# Patient Record
Sex: FEMALE | Race: OTHER | HISPANIC OR LATINO
[De-identification: names, ages, dates, MRNs, and addresses within clinical notes are randomized per-mention and may not be internally consistent; named-entity substitution may affect disease eponyms.]

---

## 2020-08-26 PROBLEM — Z00.00 ENCOUNTER FOR PREVENTIVE HEALTH EXAMINATION: Status: ACTIVE | Noted: 2020-08-26

## 2020-08-27 ENCOUNTER — APPOINTMENT (OUTPATIENT)
Dept: ENDOCRINOLOGY | Facility: CLINIC | Age: 35
End: 2020-08-27
Payer: COMMERCIAL

## 2020-08-27 ENCOUNTER — LABORATORY RESULT (OUTPATIENT)
Age: 35
End: 2020-08-27

## 2020-08-27 VITALS
BODY MASS INDEX: 38.8 KG/M2 | OXYGEN SATURATION: 97 % | DIASTOLIC BLOOD PRESSURE: 84 MMHG | HEART RATE: 97 BPM | SYSTOLIC BLOOD PRESSURE: 134 MMHG | HEIGHT: 69.5 IN | WEIGHT: 268 LBS

## 2020-08-27 DIAGNOSIS — Z83.3 FAMILY HISTORY OF DIABETES MELLITUS: ICD-10-CM

## 2020-08-27 DIAGNOSIS — Z86.39 PERSONAL HISTORY OF OTHER ENDOCRINE, NUTRITIONAL AND METABOLIC DISEASE: ICD-10-CM

## 2020-08-27 LAB
GLUCOSE BLDC GLUCOMTR-MCNC: 196
HBA1C MFR BLD HPLC: 9.5

## 2020-08-27 PROCEDURE — 36415 COLL VENOUS BLD VENIPUNCTURE: CPT

## 2020-08-27 PROCEDURE — 82962 GLUCOSE BLOOD TEST: CPT

## 2020-08-27 PROCEDURE — 99203 OFFICE O/P NEW LOW 30 MIN: CPT | Mod: 25

## 2020-08-27 NOTE — PHYSICAL EXAM
[Alert] : alert [Well Nourished] : well nourished [No Acute Distress] : no acute distress [Well Developed] : well developed [Normal Sclera/Conjunctiva] : normal sclera/conjunctiva [EOMI] : extra ocular movement intact [No Proptosis] : no proptosis [Normal Oropharynx] : the oropharynx was normal [No Thyroid Nodules] : no palpable thyroid nodules [No Respiratory Distress] : no respiratory distress [No Accessory Muscle Use] : no accessory muscle use [Clear to Auscultation] : lungs were clear to auscultation bilaterally [Normal S1, S2] : normal S1 and S2 [Normal Rate] : heart rate was normal [Regular Rhythm] : with a regular rhythm [No Edema] : no peripheral edema [Pedal Pulses Normal] : the pedal pulses are present [Normal Bowel Sounds] : normal bowel sounds [Not Tender] : non-tender [Not Distended] : not distended [Soft] : abdomen soft [Normal Anterior Cervical Nodes] : no anterior cervical lymphadenopathy [Normal Posterior Cervical Nodes] : no posterior cervical lymphadenopathy [No Spinal Tenderness] : no spinal tenderness [Spine Straight] : spine straight [No Stigmata of Cushings Syndrome] : no stigmata of Cushings Syndrome [Normal Gait] : normal gait [Normal Strength/Tone] : muscle strength and tone were normal [No Rash] : no rash [Acne] : acne present [Hirsutism] : hirsutism present [Right Foot Was Examined] : right foot ~C was examined [Left Foot Was Examined] : left foot ~C was examined [Normal] : normal [Full ROM] : with full range of motion [Normal Reflexes] : deep tendon reflexes were 2+ and symmetric [No Tremors] : no tremors [Oriented x3] : oriented to person, place, and time [Acanthosis Nigricans] : no acanthosis nigricans [de-identified] : facial hair on chin and neck [de-identified] : thyroid full no nodule appreciated [Diminished Throughout Both Feet] : normal tactile sensation with monofilament testing throughout both feet

## 2020-08-27 NOTE — ASSESSMENT
[Carbohydrate Consistent Diet] : carbohydrate consistent diet [Diabetes Foot Care] : diabetes foot care [Retinopathy Screening] : Patient was referred to ophthalmology for retinopathy screening [Importance of Diet and Exercise] : importance of diet and exercise to improve glycemic control, achieve weight loss and improve cardiovascular health [Self Monitoring of Blood Glucose] : self monitoring of blood glucose [FreeTextEntry1] : Patient with poorly controlled type 2 diabetes since her A1c today is 9.0. She will gradually increase her metformin to 1500 and day. Will start a GLP 1 weekly. f/u CDE after labs in 2 weeks. Will also meet with dietician. She will need a new glucose meter. Encourage healthy diet and lifestyle. Will check TFTs today. To do baseline thyroid sonogram, Check androgens due to hirsutism.

## 2020-08-27 NOTE — CONSULT LETTER
[Dear  ___] : Dear  [unfilled], [( Thank you for referring [unfilled] for consultation for _____ )] : Thank you for referring [unfilled] for consultation for [unfilled] [Sincerely,] : Sincerely, [Please see my note below.] : Please see my note below. [Consult Closing:] : Thank you very much for allowing me to participate in the care of this patient.  If you have any questions, please do not hesitate to contact me. [FreeTextEntry3] : Toma Solomon MD\par

## 2020-08-27 NOTE — REVIEW OF SYSTEMS
[Fatigue] : fatigue [Recent Weight Loss (___ Lbs)] : recent weight loss: [unfilled] lbs [Nasal Congestion] : nasal congestion [Constipation] : constipation [Diarrhea] : diarrhea [Joint Pain] : joint pain [Nocturia] : nocturia [Joint Stiffness] : joint stiffness [Back Pain] : back pain [Hair Loss] : hair loss [Dizziness] : dizziness [As Noted in HPI] : as noted in HPI [Negative] : Heme/Lymph [All other systems negative] : All other systems negative [FreeTextEntry5] : h/o costochondritis [FreeTextEntry4] : sinus problems, enlarged tonsild [FreeTextEntry3] : glasses [FreeTextEntry9] : wrist and ankles [de-identified] : hypothyroid since 15

## 2020-08-27 NOTE — REASON FOR VISIT
[Consultation] : a consultation visit [DM Type 2] : DM Type 2 [Hypothyroidism] : hypothyroidism [Weight Management/Obesity] : weight management/obesity [FreeTextEntry2] : Dr. Kraft

## 2020-08-27 NOTE — HISTORY OF PRESENT ILLNESS
[FreeTextEntry1] : Patient has a long-term history of hypothyroidism since age 15. She is currently on Synthroid 175 mcg daily. She's not had his recent laboratory tests. Many years ago she had a thyroid ultrasound and was not told of any abnormalities. She was diagnosed with type 2 diabetes approximately 3 years ago and is currently on metformin 500 mg twice a day. Over the past 6 months she has managed to lose around 30 pounds. She has had problems with facial hair but has always had regular menses. She has never been pregnant..

## 2020-09-09 ENCOUNTER — APPOINTMENT (OUTPATIENT)
Dept: ENDOCRINOLOGY | Facility: CLINIC | Age: 35
End: 2020-09-09
Payer: COMMERCIAL

## 2020-09-09 VITALS
HEART RATE: 95 BPM | SYSTOLIC BLOOD PRESSURE: 129 MMHG | BODY MASS INDEX: 38.98 KG/M2 | OXYGEN SATURATION: 97 % | WEIGHT: 269.25 LBS | HEIGHT: 69.5 IN | DIASTOLIC BLOOD PRESSURE: 72 MMHG

## 2020-09-09 LAB
25(OH)D3 SERPL-MCNC: 17.8 NG/ML
ALBUMIN SERPL ELPH-MCNC: 4.9 G/DL
ALP BLD-CCNC: 68 U/L
ALT SERPL-CCNC: 58 U/L
ANION GAP SERPL CALC-SCNC: 13 MMOL/L
APPEARANCE: ABNORMAL
AST SERPL-CCNC: 48 U/L
BASOPHILS # BLD AUTO: 0.05 K/UL
BASOPHILS NFR BLD AUTO: 0.5 %
BILIRUB SERPL-MCNC: 0.4 MG/DL
BILIRUBIN URINE: NEGATIVE
BLOOD URINE: NEGATIVE
BUN SERPL-MCNC: 13 MG/DL
CALCIUM SERPL-MCNC: 9.7 MG/DL
CHLORIDE SERPL-SCNC: 100 MMOL/L
CHOLEST SERPL-MCNC: 231 MG/DL
CHOLEST/HDLC SERPL: 5 RATIO
CO2 SERPL-SCNC: 24 MMOL/L
COLOR: ABNORMAL
CORTIS SERPL-MCNC: 3.4 UG/DL
CREAT SERPL-MCNC: 0.65 MG/DL
CREAT SPEC-SCNC: 284 MG/DL
DHEA-S SERPL-MCNC: 40.8 UG/DL
EOSINOPHIL # BLD AUTO: 0.2 K/UL
EOSINOPHIL NFR BLD AUTO: 2.1 %
FERRITIN SERPL-MCNC: 124 NG/ML
FOLATE SERPL-MCNC: 13.6 NG/ML
GLUCOSE BLDC GLUCOMTR-MCNC: 257
GLUCOSE QUALITATIVE U: NORMAL
GLUCOSE SERPL-MCNC: 193 MG/DL
HCT VFR BLD CALC: 41.1 %
HDLC SERPL-MCNC: 46 MG/DL
HGB BLD-MCNC: 13.7 G/DL
IMM GRANULOCYTES NFR BLD AUTO: 0.2 %
IRON SATN MFR SERPL: 23 %
IRON SERPL-MCNC: 89 UG/DL
KETONES URINE: NORMAL
LDLC SERPL CALC-MCNC: 141 MG/DL
LEUKOCYTE ESTERASE URINE: NEGATIVE
LYMPHOCYTES # BLD AUTO: 4.17 K/UL
LYMPHOCYTES NFR BLD AUTO: 44.3 %
MAN DIFF?: NORMAL
MCHC RBC-ENTMCNC: 30.3 PG
MCHC RBC-ENTMCNC: 33.3 GM/DL
MCV RBC AUTO: 90.9 FL
MICROALBUMIN 24H UR DL<=1MG/L-MCNC: 4.2 MG/DL
MICROALBUMIN/CREAT 24H UR-RTO: 15 MG/G
MONOCYTES # BLD AUTO: 0.43 K/UL
MONOCYTES NFR BLD AUTO: 4.6 %
NEUTROPHILS # BLD AUTO: 4.55 K/UL
NEUTROPHILS NFR BLD AUTO: 48.3 %
NITRITE URINE: NEGATIVE
PH URINE: 5.5
PLATELET # BLD AUTO: 181 K/UL
POTASSIUM SERPL-SCNC: 3.8 MMOL/L
PROT SERPL-MCNC: 7.7 G/DL
PROTEIN URINE: ABNORMAL
RBC # BLD: 4.52 M/UL
RBC # FLD: 11.8 %
SAVE SPECIMEN: NORMAL
SHBG SERPL-SCNC: 37 NMOL/L
SODIUM SERPL-SCNC: 137 MMOL/L
SPECIFIC GRAVITY URINE: 1.03
T4 FREE SERPL-MCNC: 1.7 NG/DL
T4 SERPL-MCNC: 8.9 UG/DL
TESTOST BND SERPL-MCNC: 1.1 PG/ML
TESTOST SERPL-MCNC: 15.2 NG/DL
THYROGLOB AB SERPL-ACNC: <20 IU/ML
THYROPEROXIDASE AB SERPL IA-ACNC: 180 IU/ML
TIBC SERPL-MCNC: 389 UG/DL
TRIGL SERPL-MCNC: 222 MG/DL
TSH SERPL-ACNC: 3.53 UIU/ML
UIBC SERPL-MCNC: 300 UG/DL
UROBILINOGEN URINE: ABNORMAL
VIT B12 SERPL-MCNC: 826 PG/ML
WBC # FLD AUTO: 9.42 K/UL

## 2020-09-09 PROCEDURE — 82962 GLUCOSE BLOOD TEST: CPT

## 2020-09-09 PROCEDURE — G0108 DIAB MANAGE TRN  PER INDIV: CPT

## 2020-09-09 RX ORDER — DULAGLUTIDE 0.75 MG/.5ML
0.75 INJECTION, SOLUTION SUBCUTANEOUS
Qty: 1 | Refills: 2 | Status: DISCONTINUED | COMMUNITY
Start: 2020-09-09 | End: 2020-09-09

## 2020-09-09 RX ORDER — BLOOD-GLUCOSE METER
W/DEVICE KIT MISCELLANEOUS
Qty: 1 | Refills: 1 | Status: ACTIVE | COMMUNITY
Start: 2020-09-09

## 2020-10-07 ENCOUNTER — APPOINTMENT (OUTPATIENT)
Dept: ENDOCRINOLOGY | Facility: CLINIC | Age: 35
End: 2020-10-07
Payer: COMMERCIAL

## 2020-10-07 VITALS
BODY MASS INDEX: 39.25 KG/M2 | SYSTOLIC BLOOD PRESSURE: 133 MMHG | OXYGEN SATURATION: 95 % | HEART RATE: 90 BPM | WEIGHT: 271.13 LBS | DIASTOLIC BLOOD PRESSURE: 85 MMHG | HEIGHT: 69.5 IN

## 2020-10-07 LAB — GLUCOSE BLDC GLUCOMTR-MCNC: 228

## 2020-10-07 PROCEDURE — 99214 OFFICE O/P EST MOD 30 MIN: CPT | Mod: 25

## 2020-10-07 PROCEDURE — 95251 CONT GLUC MNTR ANALYSIS I&R: CPT

## 2020-10-07 PROCEDURE — 95249 CONT GLUC MNTR PT PROV EQP: CPT

## 2020-10-07 PROCEDURE — 82962 GLUCOSE BLOOD TEST: CPT

## 2020-10-07 NOTE — ASSESSMENT
[FreeTextEntry1] : Patient with poorly controlled type 2 diabetes since her A1c was 9.0. She did increase her metformin to 1500 and has some mild GI symptoms. She did start Ozempic and is on 0.25 mg weekly. Will increase to 0.5 when GI symptoms resolve. . The sensor readings are excellent but she needs to scan more often. \par Will also meet with dietician.  Encourage healthy diet and lifestyle. Still to do baseline thyroid sonogram,  We did Check androgens due to hirsutism but were normal. .TFTs were stable on Synthroid 175 ug.  [Diabetes Foot Care] : diabetes foot care [Long Term Vascular Complications] : long term vascular complications of diabetes [Carbohydrate Consistent Diet] : carbohydrate consistent diet [Importance of Diet and Exercise] : importance of diet and exercise to improve glycemic control, achieve weight loss and improve cardiovascular health [Exercise/Effect on Glucose] : exercise/effect on glucose [Self Monitoring of Blood Glucose] : self monitoring of blood glucose [Retinopathy Screening] : Patient was referred to ophthalmology for retinopathy screening [Levothyroxine] : The patient was instructed to take Levothyroxine on an empty stomach, separate from vitamins, and wait at least 30 minutes before eating

## 2020-10-07 NOTE — HISTORY OF PRESENT ILLNESS
[FreeTextEntry1] : Patient has a long-term history of hypothyroidism since age 15. She is currently on Synthroid 175 mcg daily.  Many years ago she had a thyroid ultrasound and was not told of any abnormalities. She was diagnosed with type 2 diabetes approximately 3 years ago and is currently on metformin 1500 mg a  day. Over the past 6 months she has managed to lose around 30 pounds. She has had problems with facial hair but has always had regular menses. She has never been pregnant.. She started Ozempic a few sweets ago and has experienced some intermittent GI symptoms and diarrhea. She is eating less but no weight loss. She is using the jenni sensor.  [Continuous Glucose Monitoring] : Continuous Glucose Monitoring: Yes [Lakia] : Lakia [FreeTextEntry2] : 90 [FreeTextEntry3] : 10 [FreeTextEntry4] : 0 [FreeTextEntry5] : 147 [FreeTextEntry6] : 15

## 2020-10-07 NOTE — PHYSICAL EXAM
[Alert] : alert [Well Nourished] : well nourished [No Acute Distress] : no acute distress [Well Developed] : well developed [Normal Sclera/Conjunctiva] : normal sclera/conjunctiva [EOMI] : extra ocular movement intact [No Proptosis] : no proptosis [Normal Oropharynx] : the oropharynx was normal [No Thyroid Nodules] : no palpable thyroid nodules [No Respiratory Distress] : no respiratory distress [No Accessory Muscle Use] : no accessory muscle use [Clear to Auscultation] : lungs were clear to auscultation bilaterally [Normal S1, S2] : normal S1 and S2 [Normal Rate] : heart rate was normal [Regular Rhythm] : with a regular rhythm [No Edema] : no peripheral edema [Pedal Pulses Normal] : the pedal pulses are present [Normal Bowel Sounds] : normal bowel sounds [Not Tender] : non-tender [Not Distended] : not distended [Soft] : abdomen soft [Normal Anterior Cervical Nodes] : no anterior cervical lymphadenopathy [No Spinal Tenderness] : no spinal tenderness [Spine Straight] : spine straight [No Stigmata of Cushings Syndrome] : no stigmata of Cushings Syndrome [Normal Gait] : normal gait [Normal Strength/Tone] : muscle strength and tone were normal [No Rash] : no rash [Acanthosis Nigricans] : no acanthosis nigricans [Acne] : acne present [Hirsutism] : hirsutism present [Right Foot Was Examined] : right foot ~C was examined [Left Foot Was Examined] : left foot ~C was examined [Normal] : normal [Full ROM] : with full range of motion [Diminished Throughout Both Feet] : normal tactile sensation with monofilament testing throughout both feet [Normal Reflexes] : deep tendon reflexes were 2+ and symmetric [No Tremors] : no tremors [Oriented x3] : oriented to person, place, and time [de-identified] : thyroid full no nodule appreciated [de-identified] : facial hair on chin and neck

## 2020-12-03 ENCOUNTER — APPOINTMENT (OUTPATIENT)
Dept: ENDOCRINOLOGY | Facility: CLINIC | Age: 35
End: 2020-12-03
Payer: COMMERCIAL

## 2020-12-03 VITALS
TEMPERATURE: 97.5 F | DIASTOLIC BLOOD PRESSURE: 80 MMHG | HEART RATE: 86 BPM | SYSTOLIC BLOOD PRESSURE: 127 MMHG | HEIGHT: 69.5 IN | WEIGHT: 263 LBS | BODY MASS INDEX: 38.08 KG/M2 | OXYGEN SATURATION: 96 %

## 2020-12-03 LAB
GLUCOSE BLDC GLUCOMTR-MCNC: 154
HBA1C MFR BLD HPLC: 6.9

## 2020-12-03 PROCEDURE — 99213 OFFICE O/P EST LOW 20 MIN: CPT | Mod: 25

## 2020-12-03 PROCEDURE — 82962 GLUCOSE BLOOD TEST: CPT

## 2020-12-03 PROCEDURE — 99072 ADDL SUPL MATRL&STAF TM PHE: CPT

## 2020-12-03 PROCEDURE — 83036 HEMOGLOBIN GLYCOSYLATED A1C: CPT | Mod: QW

## 2020-12-03 RX ORDER — METFORMIN HYDROCHLORIDE 500 MG/1
500 TABLET, COATED ORAL
Qty: 90 | Refills: 0 | Status: ACTIVE | COMMUNITY
Start: 1900-01-01 | End: 1900-01-01

## 2020-12-03 RX ORDER — FLASH GLUCOSE SCANNING READER
EACH MISCELLANEOUS
Qty: 1 | Refills: 0 | Status: ACTIVE | COMMUNITY
Start: 2020-12-03 | End: 1900-01-01

## 2020-12-03 NOTE — PHYSICAL EXAM
[Alert] : alert [Well Nourished] : well nourished [No Acute Distress] : no acute distress [Well Developed] : well developed [Normal Sclera/Conjunctiva] : normal sclera/conjunctiva [EOMI] : extra ocular movement intact [No Proptosis] : no proptosis [Normal Oropharynx] : the oropharynx was normal [No Thyroid Nodules] : no palpable thyroid nodules [No Respiratory Distress] : no respiratory distress [No Accessory Muscle Use] : no accessory muscle use [Clear to Auscultation] : lungs were clear to auscultation bilaterally [Normal S1, S2] : normal S1 and S2 [Normal Rate] : heart rate was normal [Regular Rhythm] : with a regular rhythm [No Edema] : no peripheral edema [Pedal Pulses Normal] : the pedal pulses are present [Normal Bowel Sounds] : normal bowel sounds [Not Tender] : non-tender [Not Distended] : not distended [Soft] : abdomen soft [Normal Anterior Cervical Nodes] : no anterior cervical lymphadenopathy [No Spinal Tenderness] : no spinal tenderness [Spine Straight] : spine straight [No Stigmata of Cushings Syndrome] : no stigmata of Cushings Syndrome [Normal Gait] : normal gait [Normal Strength/Tone] : muscle strength and tone were normal [No Rash] : no rash [Acanthosis Nigricans] : no acanthosis nigricans [Acne] : acne present [Hirsutism] : hirsutism present [Right Foot Was Examined] : right foot ~C was examined [Left Foot Was Examined] : left foot ~C was examined [Normal] : normal [Full ROM] : with full range of motion [Diminished Throughout Both Feet] : normal tactile sensation with monofilament testing throughout both feet [Normal Reflexes] : deep tendon reflexes were 2+ and symmetric [No Tremors] : no tremors [Oriented x3] : oriented to person, place, and time [de-identified] : thyroid full no nodule appreciated [de-identified] : facial hair on chin and neck

## 2020-12-03 NOTE — ASSESSMENT
[FreeTextEntry1] : Patient with poorly controlled type 2 diabetes  prior  A1c was 9.6 and is now 6.9.. She did increase her metformin to 1500 and has some mild GI symptoms. She did start Ozempic and is on 0.5 mg weekly.\par Will also meet with dietician.  Encourage healthy diet and lifestyle. Still to do baseline thyroid sonogram,  We did Check androgens due to hirsutism but were normal. .TFTs were stable on Synthroid 175 ug. To upgrade to jenni 2. [Carbohydrate Consistent Diet] : carbohydrate consistent diet [Importance of Diet and Exercise] : importance of diet and exercise to improve glycemic control, achieve weight loss and improve cardiovascular health [Diabetic Medications] : Risks and benefits of diabetic medications were discussed [Levothyroxine] : The patient was instructed to take Levothyroxine on an empty stomach, separate from vitamins, and wait at least 30 minutes before eating

## 2020-12-03 NOTE — HISTORY OF PRESENT ILLNESS
[FreeTextEntry1] : Patient has a long-term history of hypothyroidism since age 15. She is currently on Synthroid 175 mcg daily.  Many years ago she had a thyroid ultrasound and was not told of any abnormalities. She was diagnosed with type 2 diabetes approximately 3 years ago and is currently on metformin 1500 mg a  day. Over the past 6 months she has managed to lose around 30 pounds. She has had problems with facial hair but has always had regular menses. She has never been pregnant.. Has been on  Ozempic a few weeks ago went up to 0.5 mg. has experienced some intermittent GI symptoms and diarrhea. She is eating less. She is using the jenni sensor. Ran out of sensors.

## 2020-12-28 ENCOUNTER — APPOINTMENT (OUTPATIENT)
Dept: ENDOCRINOLOGY | Facility: CLINIC | Age: 35
End: 2020-12-28

## 2021-03-04 ENCOUNTER — APPOINTMENT (OUTPATIENT)
Dept: ENDOCRINOLOGY | Facility: CLINIC | Age: 36
End: 2021-03-04
Payer: COMMERCIAL

## 2021-03-04 ENCOUNTER — LABORATORY RESULT (OUTPATIENT)
Age: 36
End: 2021-03-04

## 2021-03-04 VITALS
TEMPERATURE: 98.6 F | BODY MASS INDEX: 38.66 KG/M2 | SYSTOLIC BLOOD PRESSURE: 137 MMHG | DIASTOLIC BLOOD PRESSURE: 86 MMHG | WEIGHT: 267 LBS | HEART RATE: 92 BPM | OXYGEN SATURATION: 97 % | HEIGHT: 69.5 IN

## 2021-03-04 DIAGNOSIS — M25.579 PAIN IN UNSPECIFIED ANKLE AND JOINTS OF UNSPECIFIED FOOT: ICD-10-CM

## 2021-03-04 DIAGNOSIS — M79.676 PAIN IN UNSPECIFIED TOE(S): ICD-10-CM

## 2021-03-04 LAB
GLUCOSE BLDC GLUCOMTR-MCNC: 189
HBA1C MFR BLD HPLC: 7.2

## 2021-03-04 PROCEDURE — 83036 HEMOGLOBIN GLYCOSYLATED A1C: CPT | Mod: QW

## 2021-03-04 PROCEDURE — 99214 OFFICE O/P EST MOD 30 MIN: CPT | Mod: 25

## 2021-03-04 PROCEDURE — 82962 GLUCOSE BLOOD TEST: CPT

## 2021-03-04 PROCEDURE — 99072 ADDL SUPL MATRL&STAF TM PHE: CPT

## 2021-03-04 NOTE — ASSESSMENT
[FreeTextEntry1] : Patient with moderately controlled type 2 diabetes  prior  A1c was 9.6  now 7.1.. She did increase her metformin to 1500 and has some mild GI symptoms. Will try 2000 again. Try to renew Ozempic or change to different GLP1. To resume the sensor, \par  Encourage healthy diet and lifestyle. Still to do baseline thyroid sonogram,  We did Check androgens due to hirsutism but were normal. .TFTs were stable on Synthroid 175 ug. Labs and thyroid sonogram to be done.  [Diabetes Foot Care] : diabetes foot care [Long Term Vascular Complications] : long term vascular complications of diabetes [Carbohydrate Consistent Diet] : carbohydrate consistent diet [Importance of Diet and Exercise] : importance of diet and exercise to improve glycemic control, achieve weight loss and improve cardiovascular health [Self Monitoring of Blood Glucose] : self monitoring of blood glucose [Retinopathy Screening] : Patient was referred to ophthalmology for retinopathy screening [Diabetic Medications] : Risks and benefits of diabetic medications were discussed [Levothyroxine] : The patient was instructed to take Levothyroxine on an empty stomach, separate from vitamins, and wait at least 30 minutes before eating

## 2021-03-04 NOTE — HISTORY OF PRESENT ILLNESS
[FreeTextEntry1] : Patient has a long-term history of hypothyroidism since age 15. She is currently on Synthroid 175 mcg daily.  Many years ago she had a thyroid ultrasound and was not told of any abnormalities. She was diagnosed with type 2 diabetes approximately 3 years ago and is currently on metformin 1500 mg a  day. Over the past 6 months she has managed to lose around 30 pounds. She has had problems with facial hair but has always had regular menses. She has never been pregnant.. Had been on  Ozempic went up to 0.5 mg. and experienced some intermittent GI symptoms and diarrhea. She is eating less however was too expensive to renew Ozempic. . She is using the jenni sensor. Ran out of sensors. Has not done labs or thyroid sonogram.

## 2021-03-04 NOTE — PHYSICAL EXAM
[Alert] : alert [Well Nourished] : well nourished [No Acute Distress] : no acute distress [Well Developed] : well developed [Normal Sclera/Conjunctiva] : normal sclera/conjunctiva [EOMI] : extra ocular movement intact [No Proptosis] : no proptosis [Normal Oropharynx] : the oropharynx was normal [No Thyroid Nodules] : no palpable thyroid nodules [No Respiratory Distress] : no respiratory distress [No Accessory Muscle Use] : no accessory muscle use [Clear to Auscultation] : lungs were clear to auscultation bilaterally [Normal S1, S2] : normal S1 and S2 [Normal Rate] : heart rate was normal [Regular Rhythm] : with a regular rhythm [No Edema] : no peripheral edema [Pedal Pulses Normal] : the pedal pulses are present [Normal Bowel Sounds] : normal bowel sounds [Not Tender] : non-tender [Not Distended] : not distended [Soft] : abdomen soft [Normal Anterior Cervical Nodes] : no anterior cervical lymphadenopathy [No Spinal Tenderness] : no spinal tenderness [Spine Straight] : spine straight [No Stigmata of Cushings Syndrome] : no stigmata of Cushings Syndrome [Normal Gait] : normal gait [Normal Strength/Tone] : muscle strength and tone were normal [No Rash] : no rash [Acanthosis Nigricans] : no acanthosis nigricans [Acne] : acne present [Hirsutism] : hirsutism present [Right Foot Was Examined] : right foot ~C was examined [Left Foot Was Examined] : left foot ~C was examined [Normal] : normal [Full ROM] : with full range of motion [Diminished Throughout Both Feet] : normal tactile sensation with monofilament testing throughout both feet [Normal Reflexes] : deep tendon reflexes were 2+ and symmetric [No Tremors] : no tremors [Oriented x3] : oriented to person, place, and time [de-identified] : thyroid full no nodule appreciated [de-identified] : facial hair on chin and neck

## 2021-06-05 ENCOUNTER — LABORATORY RESULT (OUTPATIENT)
Age: 36
End: 2021-06-05

## 2021-06-07 LAB
25(OH)D3 SERPL-MCNC: 10.9 NG/ML
ALBUMIN SERPL ELPH-MCNC: 4.7 G/DL
ALP BLD-CCNC: 74 U/L
ALT SERPL-CCNC: 55 U/L
ANION GAP SERPL CALC-SCNC: 14 MMOL/L
APPEARANCE: ABNORMAL
AST SERPL-CCNC: 47 U/L
BASOPHILS # BLD AUTO: 0.05 K/UL
BASOPHILS NFR BLD AUTO: 0.6 %
BILIRUB SERPL-MCNC: 0.3 MG/DL
BILIRUBIN URINE: NEGATIVE
BLOOD URINE: ABNORMAL
BUN SERPL-MCNC: 11 MG/DL
CALCIUM SERPL-MCNC: 9.5 MG/DL
CHLORIDE SERPL-SCNC: 101 MMOL/L
CHOLEST SERPL-MCNC: 224 MG/DL
CO2 SERPL-SCNC: 22 MMOL/L
COLOR: ABNORMAL
CORTIS SERPL-MCNC: 5.6 UG/DL
CREAT SERPL-MCNC: 0.59 MG/DL
CREAT SPEC-SCNC: 172 MG/DL
EOSINOPHIL # BLD AUTO: 0.23 K/UL
EOSINOPHIL NFR BLD AUTO: 2.6 %
ESTIMATED AVERAGE GLUCOSE: 194 MG/DL
GLUCOSE QUALITATIVE U: NEGATIVE
GLUCOSE SERPL-MCNC: 178 MG/DL
HBA1C MFR BLD HPLC: 8.4 %
HCT VFR BLD CALC: 41.6 %
HDLC SERPL-MCNC: 48 MG/DL
HGB BLD-MCNC: 13.6 G/DL
IMM GRANULOCYTES NFR BLD AUTO: 0.2 %
KETONES URINE: NORMAL
LDLC SERPL CALC-MCNC: 147 MG/DL
LEUKOCYTE ESTERASE URINE: NEGATIVE
LYMPHOCYTES # BLD AUTO: 3.26 K/UL
LYMPHOCYTES NFR BLD AUTO: 36.4 %
MAN DIFF?: NORMAL
MCHC RBC-ENTMCNC: 29.8 PG
MCHC RBC-ENTMCNC: 32.7 GM/DL
MCV RBC AUTO: 91.2 FL
MICROALBUMIN 24H UR DL<=1MG/L-MCNC: 9.7 MG/DL
MICROALBUMIN/CREAT 24H UR-RTO: 56 MG/G
MONOCYTES # BLD AUTO: 0.51 K/UL
MONOCYTES NFR BLD AUTO: 5.7 %
NEUTROPHILS # BLD AUTO: 4.89 K/UL
NEUTROPHILS NFR BLD AUTO: 54.5 %
NITRITE URINE: NEGATIVE
NONHDLC SERPL-MCNC: 176 MG/DL
PH URINE: 5.5
PLATELET # BLD AUTO: 234 K/UL
POTASSIUM SERPL-SCNC: 4.2 MMOL/L
PROT SERPL-MCNC: 7.7 G/DL
PROTEIN URINE: ABNORMAL
RBC # BLD: 4.56 M/UL
RBC # FLD: 12 %
SODIUM SERPL-SCNC: 137 MMOL/L
SPECIFIC GRAVITY URINE: 1.03
T4 FREE SERPL-MCNC: 1.3 NG/DL
T4 SERPL-MCNC: 8.1 UG/DL
TRIGL SERPL-MCNC: 145 MG/DL
TSH SERPL-ACNC: 11.3 UIU/ML
UROBILINOGEN URINE: NORMAL
WBC # FLD AUTO: 8.96 K/UL

## 2021-06-10 ENCOUNTER — APPOINTMENT (OUTPATIENT)
Dept: ENDOCRINOLOGY | Facility: CLINIC | Age: 36
End: 2021-06-10
Payer: COMMERCIAL

## 2021-06-10 VITALS
SYSTOLIC BLOOD PRESSURE: 143 MMHG | WEIGHT: 274.5 LBS | TEMPERATURE: 97.1 F | BODY MASS INDEX: 39.74 KG/M2 | HEART RATE: 86 BPM | HEIGHT: 69.5 IN | DIASTOLIC BLOOD PRESSURE: 84 MMHG | OXYGEN SATURATION: 96 %

## 2021-06-10 LAB — GLUCOSE BLDC GLUCOMTR-MCNC: 175

## 2021-06-10 PROCEDURE — 99072 ADDL SUPL MATRL&STAF TM PHE: CPT

## 2021-06-10 PROCEDURE — 82962 GLUCOSE BLOOD TEST: CPT

## 2021-06-10 PROCEDURE — 99214 OFFICE O/P EST MOD 30 MIN: CPT | Mod: 25

## 2021-06-10 NOTE — PHYSICAL EXAM
[Alert] : alert [Well Nourished] : well nourished [No Acute Distress] : no acute distress [Well Developed] : well developed [Normal Sclera/Conjunctiva] : normal sclera/conjunctiva [EOMI] : extra ocular movement intact [No Proptosis] : no proptosis [Normal Oropharynx] : the oropharynx was normal [No Thyroid Nodules] : no palpable thyroid nodules [No Respiratory Distress] : no respiratory distress [No Accessory Muscle Use] : no accessory muscle use [Clear to Auscultation] : lungs were clear to auscultation bilaterally [Normal S1, S2] : normal S1 and S2 [Normal Rate] : heart rate was normal [Regular Rhythm] : with a regular rhythm [No Edema] : no peripheral edema [Pedal Pulses Normal] : the pedal pulses are present [Normal Bowel Sounds] : normal bowel sounds [Not Tender] : non-tender [Not Distended] : not distended [Soft] : abdomen soft [Normal Anterior Cervical Nodes] : no anterior cervical lymphadenopathy [No Spinal Tenderness] : no spinal tenderness [Spine Straight] : spine straight [No Stigmata of Cushings Syndrome] : no stigmata of Cushings Syndrome [Normal Gait] : normal gait [Normal Strength/Tone] : muscle strength and tone were normal [No Rash] : no rash [Acanthosis Nigricans] : no acanthosis nigricans [Acne] : acne present [Hirsutism] : hirsutism present [Right Foot Was Examined] : right foot ~C was examined [Left Foot Was Examined] : left foot ~C was examined [Normal] : normal [Full ROM] : with full range of motion [Diminished Throughout Both Feet] : normal tactile sensation with monofilament testing throughout both feet [Normal Reflexes] : deep tendon reflexes were 2+ and symmetric [No Tremors] : no tremors [Oriented x3] : oriented to person, place, and time [de-identified] : thyroid full no nodule appreciated [de-identified] : facial hair on chin and neck

## 2021-06-10 NOTE — HISTORY OF PRESENT ILLNESS
[FreeTextEntry1] : Patient has a long-term history of hypothyroidism since age 15. She is currently on Synthroid 175 mcg daily.  Many years ago she had a thyroid ultrasound and was not told of any abnormalities. She was diagnosed with type 2 diabetes approximately 3 years ago and is currently on metformin 1500 mg a  day. Over the past 6 months she has managed to lose around 30 pounds. She has had problems with facial hair but has always had regular menses. She has never been pregnant.. Had been on  Ozempic went up to 0.5 mg. and experienced some intermittent GI symptoms and diarrhea. She is eating less however was too expensive to renew Ozempic. . She is using the jenni sensor. Ran out of sensors. Has not done thyroid sonogram yet. .

## 2021-06-10 NOTE — ASSESSMENT
[FreeTextEntry1] : Patient with moderately controlled type 2 diabetes  prior  A1c was 9.6  then  7.1 now back up to 8.4. .. She did increase her metformin to 1500 and has some mild GI symptoms. Will try 2000 again. Try to renew Ozempic or change to different GLP1. To resume the sensor, Was given samples of Ozempic. \par  Encourage healthy diet and lifestyle. Still to do baseline thyroid sonogram,  We did Check androgens due to hirsutism but were normal. .TFTs showed high on  Synthroid 175 ug so will increase to 200 ug since has been compliant. .\par f/u CDE one mos and myself in 3 mos. Can check TFTs at CDE visit.  [Diabetes Foot Care] : diabetes foot care [Long Term Vascular Complications] : long term vascular complications of diabetes [Carbohydrate Consistent Diet] : carbohydrate consistent diet [Importance of Diet and Exercise] : importance of diet and exercise to improve glycemic control, achieve weight loss and improve cardiovascular health [Hypoglycemia Management] : hypoglycemia management [Retinopathy Screening] : Patient was referred to ophthalmology for retinopathy screening [Diabetic Medications] : Risks and benefits of diabetic medications were discussed [Levothyroxine] : The patient was instructed to take Levothyroxine on an empty stomach, separate from vitamins, and wait at least 30 minutes before eating

## 2021-07-15 ENCOUNTER — APPOINTMENT (OUTPATIENT)
Dept: ENDOCRINOLOGY | Facility: CLINIC | Age: 36
End: 2021-07-15

## 2021-09-30 ENCOUNTER — APPOINTMENT (OUTPATIENT)
Dept: ENDOCRINOLOGY | Facility: CLINIC | Age: 36
End: 2021-09-30
Payer: COMMERCIAL

## 2021-09-30 ENCOUNTER — LABORATORY RESULT (OUTPATIENT)
Age: 36
End: 2021-09-30

## 2021-09-30 VITALS
DIASTOLIC BLOOD PRESSURE: 84 MMHG | OXYGEN SATURATION: 96 % | WEIGHT: 276 LBS | BODY MASS INDEX: 40.88 KG/M2 | HEIGHT: 69 IN | HEART RATE: 84 BPM | SYSTOLIC BLOOD PRESSURE: 135 MMHG

## 2021-09-30 LAB
GLUCOSE BLDC GLUCOMTR-MCNC: 227
HBA1C MFR BLD HPLC: 7.9

## 2021-09-30 PROCEDURE — 83036 HEMOGLOBIN GLYCOSYLATED A1C: CPT | Mod: QW

## 2021-09-30 PROCEDURE — 82962 GLUCOSE BLOOD TEST: CPT

## 2021-09-30 PROCEDURE — 99214 OFFICE O/P EST MOD 30 MIN: CPT | Mod: 25

## 2021-09-30 NOTE — ASSESSMENT
[FreeTextEntry1] : Patient with moderately controlled type 2 diabetes  prior  A1c was  8.4 now 7.9. .. She did increase her metformin to  2000 .. Try to renew Ozempic or change to different GLP1. To resume the sensor, Was given samples of Ozempic. To do labs.and thyroid sonogram. \par  Encourage healthy diet and lifestyle. Still to do baseline thyroid sonogram,  We did Check androgens due to hirsutism but were normal. .TFTs showed high on  Synthroid 175 ug so will increase to 200 ug since has been compliant. .\par f/u 3 mos.  [Diabetes Foot Care] : diabetes foot care [Long Term Vascular Complications] : long term vascular complications of diabetes [Carbohydrate Consistent Diet] : carbohydrate consistent diet [Importance of Diet and Exercise] : importance of diet and exercise to improve glycemic control, achieve weight loss and improve cardiovascular health [Hypoglycemia Management] : hypoglycemia management [Self Monitoring of Blood Glucose] : self monitoring of blood glucose [Diabetic Medications] : Risks and benefits of diabetic medications were discussed [Levothyroxine] : The patient was instructed to take Levothyroxine on an empty stomach, separate from vitamins, and wait at least 30 minutes before eating

## 2021-09-30 NOTE — PHYSICAL EXAM
[Alert] : alert [Well Nourished] : well nourished [No Acute Distress] : no acute distress [Well Developed] : well developed [Normal Sclera/Conjunctiva] : normal sclera/conjunctiva [EOMI] : extra ocular movement intact [No Proptosis] : no proptosis [Normal Oropharynx] : the oropharynx was normal [No Thyroid Nodules] : no palpable thyroid nodules [No Respiratory Distress] : no respiratory distress [No Accessory Muscle Use] : no accessory muscle use [Clear to Auscultation] : lungs were clear to auscultation bilaterally [Normal S1, S2] : normal S1 and S2 [Normal Rate] : heart rate was normal [Regular Rhythm] : with a regular rhythm [No Edema] : no peripheral edema [Pedal Pulses Normal] : the pedal pulses are present [Normal Bowel Sounds] : normal bowel sounds [Not Tender] : non-tender [Not Distended] : not distended [Soft] : abdomen soft [Normal Anterior Cervical Nodes] : no anterior cervical lymphadenopathy [No Spinal Tenderness] : no spinal tenderness [Spine Straight] : spine straight [No Stigmata of Cushings Syndrome] : no stigmata of Cushings Syndrome [Normal Gait] : normal gait [Normal Strength/Tone] : muscle strength and tone were normal [No Rash] : no rash [Acanthosis Nigricans] : no acanthosis nigricans [Acne] : acne present [Hirsutism] : hirsutism present [Right Foot Was Examined] : right foot ~C was examined [Left Foot Was Examined] : left foot ~C was examined [Normal] : normal [Full ROM] : with full range of motion [Diminished Throughout Both Feet] : normal tactile sensation with monofilament testing throughout both feet [Normal Reflexes] : deep tendon reflexes were 2+ and symmetric [No Tremors] : no tremors [Oriented x3] : oriented to person, place, and time [de-identified] : thyroid full no nodule appreciated [de-identified] : facial hair on chin and neck

## 2021-09-30 NOTE — HISTORY OF PRESENT ILLNESS
[FreeTextEntry1] : Patient has a long-term history of hypothyroidism since age 15. She is currently on Synthroid 175 mcg daily.  Many years ago she had a thyroid ultrasound and was not told of any abnormalities. She was diagnosed with type 2 diabetes approximately 3 years ago and is currently on metformin 1500 mg a  day. Over the past 6 months she has managed to lose around 30 pounds. She has had problems with facial hair but has always had regular menses. She has never been pregnant.. Had been on  Ozempic went up to 0.5 mg. and experienced some intermittent GI symptoms and diarrhea. She is eating less however was too expensive to renew Ozempic. . She is using the jenni sensor. Ran out of sensors. Has not done thyroid sonogram yet. . Was on steroids recently for an allergic reaction.

## 2021-10-01 ENCOUNTER — LABORATORY RESULT (OUTPATIENT)
Age: 36
End: 2021-10-01

## 2021-10-05 ENCOUNTER — RX RENEWAL (OUTPATIENT)
Age: 36
End: 2021-10-05

## 2021-10-05 LAB
25(OH)D3 SERPL-MCNC: 14.1 NG/ML
ALBUMIN SERPL ELPH-MCNC: 4.8 G/DL
ALP BLD-CCNC: 82 U/L
ALT SERPL-CCNC: 73 U/L
ANION GAP SERPL CALC-SCNC: 15 MMOL/L
APPEARANCE: CLEAR
AST SERPL-CCNC: 66 U/L
BASOPHILS # BLD AUTO: 0.08 K/UL
BASOPHILS NFR BLD AUTO: 0.8 %
BILIRUB SERPL-MCNC: 0.3 MG/DL
BILIRUBIN URINE: NEGATIVE
BLOOD URINE: ABNORMAL
BUN SERPL-MCNC: 14 MG/DL
CALCIUM SERPL-MCNC: 10.3 MG/DL
CHLORIDE SERPL-SCNC: 101 MMOL/L
CHOLEST SERPL-MCNC: 230 MG/DL
CO2 SERPL-SCNC: 23 MMOL/L
COLOR: YELLOW
CORTIS SERPL-MCNC: 4.3 UG/DL
CREAT SERPL-MCNC: 0.6 MG/DL
CREAT SPEC-SCNC: 209 MG/DL
EOSINOPHIL # BLD AUTO: 0.46 K/UL
EOSINOPHIL NFR BLD AUTO: 4.7 %
GLUCOSE QUALITATIVE U: NEGATIVE
GLUCOSE SERPL-MCNC: 194 MG/DL
HCT VFR BLD CALC: 38.9 %
HDLC SERPL-MCNC: 44 MG/DL
HGB BLD-MCNC: 13.1 G/DL
IMM GRANULOCYTES NFR BLD AUTO: 0.2 %
KETONES URINE: NORMAL
LDLC SERPL CALC-MCNC: 142 MG/DL
LEUKOCYTE ESTERASE URINE: ABNORMAL
LYMPHOCYTES # BLD AUTO: 3.83 K/UL
LYMPHOCYTES NFR BLD AUTO: 39.1 %
MAN DIFF?: NORMAL
MCHC RBC-ENTMCNC: 30.6 PG
MCHC RBC-ENTMCNC: 33.7 GM/DL
MCV RBC AUTO: 90.9 FL
MICROALBUMIN 24H UR DL<=1MG/L-MCNC: 3.9 MG/DL
MICROALBUMIN/CREAT 24H UR-RTO: 19 MG/G
MONOCYTES # BLD AUTO: 0.48 K/UL
MONOCYTES NFR BLD AUTO: 4.9 %
NEUTROPHILS # BLD AUTO: 4.93 K/UL
NEUTROPHILS NFR BLD AUTO: 50.3 %
NITRITE URINE: NEGATIVE
NONHDLC SERPL-MCNC: 186 MG/DL
PH URINE: 6
PLATELET # BLD AUTO: 217 K/UL
POTASSIUM SERPL-SCNC: 4.2 MMOL/L
PROT SERPL-MCNC: 7.9 G/DL
PROTEIN URINE: ABNORMAL
RBC # BLD: 4.28 M/UL
RBC # FLD: 12.2 %
SODIUM SERPL-SCNC: 139 MMOL/L
SPECIFIC GRAVITY URINE: 1.03
T4 FREE SERPL-MCNC: 1.6 NG/DL
T4 SERPL-MCNC: 9.5 UG/DL
TRIGL SERPL-MCNC: 221 MG/DL
TSH SERPL-ACNC: 3.74 UIU/ML
UROBILINOGEN URINE: NORMAL
WBC # FLD AUTO: 9.8 K/UL

## 2021-10-07 DIAGNOSIS — N39.0 URINARY TRACT INFECTION, SITE NOT SPECIFIED: ICD-10-CM

## 2021-10-28 ENCOUNTER — APPOINTMENT (OUTPATIENT)
Dept: ENDOCRINOLOGY | Facility: CLINIC | Age: 36
End: 2021-10-28

## 2022-02-07 ENCOUNTER — RX RENEWAL (OUTPATIENT)
Age: 37
End: 2022-02-07

## 2022-04-06 ENCOUNTER — RX RENEWAL (OUTPATIENT)
Age: 37
End: 2022-04-06

## 2022-04-28 ENCOUNTER — APPOINTMENT (OUTPATIENT)
Dept: ENDOCRINOLOGY | Facility: CLINIC | Age: 37
End: 2022-04-28
Payer: COMMERCIAL

## 2022-04-28 VITALS
HEART RATE: 92 BPM | SYSTOLIC BLOOD PRESSURE: 134 MMHG | HEIGHT: 69 IN | WEIGHT: 267 LBS | OXYGEN SATURATION: 95 % | DIASTOLIC BLOOD PRESSURE: 84 MMHG | BODY MASS INDEX: 39.55 KG/M2

## 2022-04-28 LAB — GLUCOSE BLDC GLUCOMTR-MCNC: 135

## 2022-04-28 PROCEDURE — 82962 GLUCOSE BLOOD TEST: CPT

## 2022-04-28 PROCEDURE — 99214 OFFICE O/P EST MOD 30 MIN: CPT | Mod: 25

## 2022-04-28 NOTE — ASSESSMENT
[FreeTextEntry1] : Patient with moderately controlled type 2 diabetes  prior  A1c was  8.4 now 7.9. .. She did increase her metformin to  2000 .. Continue  Ozempic 0.5 weekly will consider increase next visit. . To do labs.and thyroid sonogram. \par  Encourage healthy diet and lifestyle. Still to do baseline thyroid sonogram,  We did Check androgens due to hirsutism but were normal. .TFTs showed high on  Synthroid 175 ug so  was to 200 ug now TSH suppressed so will skip 2 pills /mos. ( labs were from PCP om her cell phone).  [Carbohydrate Consistent Diet] : carbohydrate consistent diet [Importance of Diet and Exercise] : importance of diet and exercise to improve glycemic control, achieve weight loss and improve cardiovascular health [Self Monitoring of Blood Glucose] : self monitoring of blood glucose [Diabetic Medications] : Risks and benefits of diabetic medications were discussed [Levothyroxine] : The patient was instructed to take Levothyroxine on an empty stomach, separate from vitamins, and wait at least 30 minutes before eating

## 2022-04-28 NOTE — PHYSICAL EXAM
[Alert] : alert [Well Nourished] : well nourished [No Acute Distress] : no acute distress [Well Developed] : well developed [Normal Sclera/Conjunctiva] : normal sclera/conjunctiva [EOMI] : extra ocular movement intact [No Proptosis] : no proptosis [Normal Oropharynx] : the oropharynx was normal [No Thyroid Nodules] : no palpable thyroid nodules [No Respiratory Distress] : no respiratory distress [No Accessory Muscle Use] : no accessory muscle use [Clear to Auscultation] : lungs were clear to auscultation bilaterally [Normal S1, S2] : normal S1 and S2 [Normal Rate] : heart rate was normal [Regular Rhythm] : with a regular rhythm [No Edema] : no peripheral edema [Pedal Pulses Normal] : the pedal pulses are present [Normal Bowel Sounds] : normal bowel sounds [Not Tender] : non-tender [Not Distended] : not distended [Soft] : abdomen soft [Normal Anterior Cervical Nodes] : no anterior cervical lymphadenopathy [No Spinal Tenderness] : no spinal tenderness [Spine Straight] : spine straight [No Stigmata of Cushings Syndrome] : no stigmata of Cushings Syndrome [Normal Gait] : normal gait [Normal Strength/Tone] : muscle strength and tone were normal [No Rash] : no rash [Acanthosis Nigricans] : no acanthosis nigricans [Acne] : acne present [Hirsutism] : hirsutism present [Normal] : normal [Full ROM] : with full range of motion [Diminished Throughout Both Feet] : normal tactile sensation with monofilament testing throughout both feet [Normal Reflexes] : deep tendon reflexes were 2+ and symmetric [No Tremors] : no tremors [Oriented x3] : oriented to person, place, and time [de-identified] : thyroid full no nodule appreciated [de-identified] : facial hair on chin and neck

## 2022-06-09 ENCOUNTER — RX RENEWAL (OUTPATIENT)
Age: 37
End: 2022-06-09

## 2022-06-20 ENCOUNTER — APPOINTMENT (OUTPATIENT)
Dept: ENDOCRINOLOGY | Facility: CLINIC | Age: 37
End: 2022-06-20
Payer: COMMERCIAL

## 2022-06-20 VITALS
OXYGEN SATURATION: 100 % | WEIGHT: 268 LBS | SYSTOLIC BLOOD PRESSURE: 127 MMHG | HEIGHT: 69 IN | BODY MASS INDEX: 39.69 KG/M2 | DIASTOLIC BLOOD PRESSURE: 82 MMHG | HEART RATE: 83 BPM

## 2022-06-20 LAB
GLUCOSE BLDC GLUCOMTR-MCNC: 110
HBA1C MFR BLD HPLC: 6.4

## 2022-06-20 PROCEDURE — 99214 OFFICE O/P EST MOD 30 MIN: CPT | Mod: 25

## 2022-06-20 PROCEDURE — 82962 GLUCOSE BLOOD TEST: CPT

## 2022-06-20 PROCEDURE — 83036 HEMOGLOBIN GLYCOSYLATED A1C: CPT | Mod: QW

## 2022-06-20 RX ORDER — LANCETS 28 GAUGE
EACH MISCELLANEOUS
Qty: 1 | Refills: 5 | Status: ACTIVE | COMMUNITY
Start: 2020-09-09 | End: 1900-01-01

## 2022-06-20 RX ORDER — FLASH GLUCOSE SENSOR
KIT MISCELLANEOUS
Qty: 2 | Refills: 5 | Status: COMPLETED | COMMUNITY
Start: 2020-12-03 | End: 2022-06-20

## 2022-06-20 NOTE — HISTORY OF PRESENT ILLNESS
[FreeTextEntry1] : Patient has a long-term history of hypothyroidism since age 15. She is currently on Synthroid 175 mcg daily.  Many years ago she had a thyroid ultrasound and was not told of any abnormalities. She was diagnosed with type 2 diabetes approximately 3 years ago and is currently on metformin 1500 mg a  day. Over the past 6 months she has managed to lose around 30 pounds. She has had problems with facial hair but has always had regular menses. She has never been pregnant.. Had been on  Ozempic went up to 0.5 mg. and experienced some intermittent GI symptoms and diarrhea. She is eating less however was too expensive to renew Ozempic. . She was using the jenni sensor. Ran out of sensors and did not renew. . Has not done thyroid sonogram yet. . Was on steroids recently for an allergic reaction.

## 2022-06-20 NOTE — PHYSICAL EXAM
[Alert] : alert [Well Nourished] : well nourished [No Acute Distress] : no acute distress [Well Developed] : well developed [Normal Sclera/Conjunctiva] : normal sclera/conjunctiva [EOMI] : extra ocular movement intact [No Proptosis] : no proptosis [Normal Oropharynx] : the oropharynx was normal [No Thyroid Nodules] : no palpable thyroid nodules [No Respiratory Distress] : no respiratory distress [No Accessory Muscle Use] : no accessory muscle use [Clear to Auscultation] : lungs were clear to auscultation bilaterally [Normal S1, S2] : normal S1 and S2 [Normal Rate] : heart rate was normal [Regular Rhythm] : with a regular rhythm [No Edema] : no peripheral edema [Pedal Pulses Normal] : the pedal pulses are present [Normal Bowel Sounds] : normal bowel sounds [Not Tender] : non-tender [Not Distended] : not distended [Soft] : abdomen soft [Normal Anterior Cervical Nodes] : no anterior cervical lymphadenopathy [No Spinal Tenderness] : no spinal tenderness [Spine Straight] : spine straight [No Stigmata of Cushings Syndrome] : no stigmata of Cushings Syndrome [Normal Gait] : normal gait [Normal Strength/Tone] : muscle strength and tone were normal [No Rash] : no rash [Acanthosis Nigricans] : no acanthosis nigricans [Acne] : acne present [Hirsutism] : hirsutism present [Normal] : normal [Full ROM] : with full range of motion [Diminished Throughout Both Feet] : normal tactile sensation with monofilament testing throughout both feet [Normal Reflexes] : deep tendon reflexes were 2+ and symmetric [No Tremors] : no tremors [Oriented x3] : oriented to person, place, and time [de-identified] : thyroid full no nodule appreciated [de-identified] : facial hair on chin and neck

## 2022-06-20 NOTE — ASSESSMENT
[FreeTextEntry1] : Patient with moderately controlled type 2 diabetes  prior  A1c was  8.4 , 7.9 and now 6.4. .. She did increase her metformin to  2000 .. Continue  Ozempic but will increase to 1 mg. .  To do labs.and thyroid sonogram. \par  Encourage healthy diet and lifestyle. Still to do baseline thyroid sonogram,  We did Check androgens due to hirsutism but were normal. .TFTs showed high on  Synthroid 175 ug so  was to 200 ug now TSH suppressed so will skip 2 pills /mos. ( labs were from PCP om her cell phone).  · Pt denies drug use, UDS pending   · Encephalopathic, uncle called 45 as pt abusing PCP laced marijuana [Diabetes Foot Care] : diabetes foot care [Long Term Vascular Complications] : long term vascular complications of diabetes [Carbohydrate Consistent Diet] : carbohydrate consistent diet [Importance of Diet and Exercise] : importance of diet and exercise to improve glycemic control, achieve weight loss and improve cardiovascular health [Hypoglycemia Management] : hypoglycemia management [Self Monitoring of Blood Glucose] : self monitoring of blood glucose [Retinopathy Screening] : Patient was referred to ophthalmology for retinopathy screening [Diabetic Medications] : Risks and benefits of diabetic medications were discussed [Levothyroxine] : The patient was instructed to take Levothyroxine on an empty stomach, separate from vitamins, and wait at least 30 minutes before eating

## 2022-08-22 ENCOUNTER — RX RENEWAL (OUTPATIENT)
Age: 37
End: 2022-08-22

## 2022-09-19 ENCOUNTER — LABORATORY RESULT (OUTPATIENT)
Age: 37
End: 2022-09-19

## 2022-09-20 LAB
25(OH)D3 SERPL-MCNC: 13.4 NG/ML
ACTH SER-ACNC: 17.6 PG/ML
ALBUMIN SERPL ELPH-MCNC: 4.3 G/DL
ALP BLD-CCNC: 53 U/L
ALT SERPL-CCNC: 51 U/L
ANION GAP SERPL CALC-SCNC: 13 MMOL/L
APPEARANCE: ABNORMAL
AST SERPL-CCNC: 38 U/L
BILIRUB SERPL-MCNC: 0.7 MG/DL
BILIRUBIN URINE: ABNORMAL
BLOOD URINE: ABNORMAL
BUN SERPL-MCNC: 10 MG/DL
CALCIUM SERPL-MCNC: 9.3 MG/DL
CHLORIDE SERPL-SCNC: 107 MMOL/L
CHOLEST SERPL-MCNC: 138 MG/DL
CO2 SERPL-SCNC: 23 MMOL/L
COLOR: ABNORMAL
CORTIS SERPL-MCNC: 4.5 UG/DL
CREAT SERPL-MCNC: 0.59 MG/DL
CREAT SPEC-SCNC: 184 MG/DL
EGFR: 118 ML/MIN/1.73M2
GLUCOSE QUALITATIVE U: NEGATIVE
GLUCOSE SERPL-MCNC: 126 MG/DL
HDLC SERPL-MCNC: 55 MG/DL
KETONES URINE: NORMAL
LDLC SERPL CALC-MCNC: 64 MG/DL
LEUKOCYTE ESTERASE URINE: ABNORMAL
MICROALBUMIN 24H UR DL<=1MG/L-MCNC: 32.6 MG/DL
MICROALBUMIN/CREAT 24H UR-RTO: 178 MG/G
NITRITE URINE: POSITIVE
NONHDLC SERPL-MCNC: 83 MG/DL
PH URINE: 5
POTASSIUM SERPL-SCNC: 4 MMOL/L
PROT SERPL-MCNC: 7 G/DL
PROTEIN URINE: ABNORMAL
SODIUM SERPL-SCNC: 143 MMOL/L
SPECIFIC GRAVITY URINE: >=1.03
T4 FREE SERPL-MCNC: 2 NG/DL
T4 SERPL-MCNC: 9.6 UG/DL
TRIGL SERPL-MCNC: 98 MG/DL
TSH SERPL-ACNC: 0.02 UIU/ML
UROBILINOGEN URINE: NORMAL

## 2022-09-22 ENCOUNTER — APPOINTMENT (OUTPATIENT)
Dept: ENDOCRINOLOGY | Facility: CLINIC | Age: 37
End: 2022-09-22

## 2022-09-22 ENCOUNTER — RX RENEWAL (OUTPATIENT)
Age: 37
End: 2022-09-22

## 2022-09-22 PROCEDURE — 99213 OFFICE O/P EST LOW 20 MIN: CPT | Mod: 95

## 2022-09-22 RX ORDER — ERGOCALCIFEROL 1.25 MG/1
1.25 MG CAPSULE, LIQUID FILLED ORAL
Qty: 12 | Refills: 1 | Status: ACTIVE | COMMUNITY
Start: 2022-09-22 | End: 1900-01-01

## 2022-09-22 NOTE — HISTORY OF PRESENT ILLNESS
[FreeTextEntry1] : Patient has a long-term history of hypothyroidism since age 15. She is currently on Synthroid 200 mcg daily skips 2 pills/mos.  Many years ago she had a thyroid ultrasound and was not told of any abnormalities. She was diagnosed with type 2 diabetes approximately 3 years ago and is currently on metformin 2000 mg a  day and Ozempic 1 mg. . Over the past 6 months she has managed to lose around 30 pounds. She has had problems with facial hair but has always had regular menses. She has never been pregnant.. She is eating less . She was using the jenni sensor. Ran out of sensors and did not renew. . Has not done thyroid sonogram yet. . Was on steroids recently for an allergic reaction.

## 2022-09-22 NOTE — PHYSICAL EXAM
[Alert] : alert [No Acute Distress] : no acute distress [Normal Voice/Communication] : normal voice communication [Normal Sclera/Conjunctiva] : normal sclera/conjunctiva [Normal Outer Ear/Nose] : the ears and nose were normal in appearance [Thyroid Not Enlarged] : the thyroid was not enlarged [No Respiratory Distress] : no respiratory distress [Oriented x3] : oriented to person, place, and time [Normal Insight/Judgement] : insight and judgment were intact

## 2022-09-22 NOTE — ASSESSMENT
[FreeTextEntry1] : Patient with moderately controlled type 2 diabetes  prior  A1c was  8.4 , 7.9 and then  6.4. .. She did increase her metformin to  2000 and  Ozempic to 1 mg. . TSH suppressed so will take Synthroid 200 ug 6 days a week.  \par  Encourage healthy diet and lifestyle. Still to do baseline thyroid sonogram,  We did Check androgens due to hirsutism but were normal. .\par f/u 3 mos \par Vit D remains low so will take 50,000 weekly.  [Diabetes Foot Care] : diabetes foot care [Long Term Vascular Complications] : long term vascular complications of diabetes [Carbohydrate Consistent Diet] : carbohydrate consistent diet [Importance of Diet and Exercise] : importance of diet and exercise to improve glycemic control, achieve weight loss and improve cardiovascular health [Self Monitoring of Blood Glucose] : self monitoring of blood glucose [Diabetic Medications] : Risks and benefits of diabetic medications were discussed [Levothyroxine] : The patient was instructed to take Levothyroxine on an empty stomach, separate from vitamins, and wait at least 30 minutes before eating

## 2022-09-22 NOTE — REASON FOR VISIT
[Home] : at home, [unfilled] , at the time of the visit. [Medical Office: ([facility])___] : at the medical office located in  [Follow - Up] : a follow-up visit [DM Type 2] : DM Type 2 [Weight Management/Obesity] : weight management/obesity [Patient] : the patient [Self] : self

## 2022-09-28 LAB — DHEA SERPL-MCNC: 102 NG/DL

## 2022-10-12 ENCOUNTER — RX RENEWAL (OUTPATIENT)
Age: 37
End: 2022-10-12

## 2022-10-12 LAB
CORTICOSTEROID BIND GLOBULIN: 1.6 MG/DL
CORTIS SERPL-MCNC: 4.5 UG/DL
CORTISOL, FREE: 0.23 UG/DL
PFCX: 5.1 %

## 2022-12-15 ENCOUNTER — RX RENEWAL (OUTPATIENT)
Age: 37
End: 2022-12-15

## 2023-01-03 ENCOUNTER — APPOINTMENT (OUTPATIENT)
Dept: ENDOCRINOLOGY | Facility: CLINIC | Age: 38
End: 2023-01-03
Payer: COMMERCIAL

## 2023-01-03 PROCEDURE — 99213 OFFICE O/P EST LOW 20 MIN: CPT | Mod: 95

## 2023-01-03 NOTE — REASON FOR VISIT
[Home] : at home, [unfilled] , at the time of the visit. [Medical Office: ([facility])___] : at the medical office located in  [Patient] : the patient [Self] : self [Follow - Up] : a follow-up visit [DM Type 2] : DM Type 2 [Weight Management/Obesity] : weight management/obesity

## 2023-01-03 NOTE — ASSESSMENT
[Diabetes Foot Care] : diabetes foot care [Long Term Vascular Complications] : long term vascular complications of diabetes [Carbohydrate Consistent Diet] : carbohydrate consistent diet [Importance of Diet and Exercise] : importance of diet and exercise to improve glycemic control, achieve weight loss and improve cardiovascular health [Self Monitoring of Blood Glucose] : self monitoring of blood glucose [Retinopathy Screening] : Patient was referred to ophthalmology for retinopathy screening [Diabetic Medications] : Risks and benefits of diabetic medications were discussed [Levothyroxine] : The patient was instructed to take Levothyroxine on an empty stomach, separate from vitamins, and wait at least 30 minutes before eating [FreeTextEntry1] : Patient with moderately controlled type 2 diabetes  prior  A1c was  8.4 , 7.9 and then  6.4.. She did increase her metformin to  2000 and  Ozempic to 2 mg. . TSH suppressed so 3 mos ago decreased  Synthroid 200 ug  to6 days a week.  Needs to do labs \par  Encourage healthy diet and lifestyle. Still to do baseline thyroid sonogram,  We did Check androgens due to hirsutism but were normal. .\par f/u 3 mos \par Vit D remains low so will take 50,000 weekly.

## 2023-01-03 NOTE — HISTORY OF PRESENT ILLNESS
[FreeTextEntry1] : Patient has a long-term history of hypothyroidism since age 15. She is currently on Synthroid 200 mcg daily skips one tab weekly. .  Many years ago she had a thyroid ultrasound and was not told of any abnormalities. She was diagnosed with type 2 diabetes approximately 3 years ago and is currently on metformin 2000 mg a  day and Ozempic 2 mg. . Over the past 6 months she has managed to lose around 30 pounds. She has had problems with facial hair but has always had regular menses. She has never been pregnant.. She is eating less . She was using the jenni sensor. Ran out of sensors and did not renew. . Has not done thyroid sonogram yet. . Was on steroids recently for an allergic reaction.

## 2023-01-17 NOTE — REASON FOR VISIT
Helical Rim Text: The closure involved the helical rim. [Follow - Up] : a follow-up visit [DM Type 2] : DM Type 2 [Weight Management/Obesity] : weight management/obesity

## 2023-01-19 ENCOUNTER — APPOINTMENT (OUTPATIENT)
Dept: ENDOCRINOLOGY | Facility: CLINIC | Age: 38
End: 2023-01-19

## 2023-03-05 ENCOUNTER — NON-APPOINTMENT (OUTPATIENT)
Age: 38
End: 2023-03-05

## 2023-03-13 ENCOUNTER — RX RENEWAL (OUTPATIENT)
Age: 38
End: 2023-03-13

## 2023-03-18 ENCOUNTER — LABORATORY RESULT (OUTPATIENT)
Age: 38
End: 2023-03-18

## 2023-04-03 LAB
25(OH)D3 SERPL-MCNC: 27.8 NG/ML
ALBUMIN SERPL ELPH-MCNC: 5 G/DL
ALP BLD-CCNC: 57 U/L
ALT SERPL-CCNC: 50 U/L
ANION GAP SERPL CALC-SCNC: 14 MMOL/L
APPEARANCE: ABNORMAL
AST SERPL-CCNC: 50 U/L
BILIRUB SERPL-MCNC: 0.6 MG/DL
BILIRUBIN URINE: NEGATIVE
BLOOD URINE: ABNORMAL
BUN SERPL-MCNC: 10 MG/DL
CALCIUM SERPL-MCNC: 9.9 MG/DL
CHLORIDE SERPL-SCNC: 105 MMOL/L
CHOLEST SERPL-MCNC: 143 MG/DL
CO2 SERPL-SCNC: 22 MMOL/L
COLOR: YELLOW
CREAT SERPL-MCNC: 0.66 MG/DL
CREAT SPEC-SCNC: 231 MG/DL
EGFR: 115 ML/MIN/1.73M2
ESTIMATED AVERAGE GLUCOSE: 137 MG/DL
GLUCOSE QUALITATIVE U: NEGATIVE
GLUCOSE SERPL-MCNC: 111 MG/DL
HBA1C MFR BLD HPLC: 6.4 %
HDLC SERPL-MCNC: 54 MG/DL
KETONES URINE: NEGATIVE
LDLC SERPL CALC-MCNC: 71 MG/DL
LEUKOCYTE ESTERASE URINE: NEGATIVE
MICROALBUMIN 24H UR DL<=1MG/L-MCNC: 7.8 MG/DL
MICROALBUMIN/CREAT 24H UR-RTO: 34 MG/G
NITRITE URINE: NEGATIVE
NONHDLC SERPL-MCNC: 90 MG/DL
PH URINE: 5.5
POTASSIUM SERPL-SCNC: 4.1 MMOL/L
PROT SERPL-MCNC: 7.7 G/DL
PROTEIN URINE: ABNORMAL
SODIUM SERPL-SCNC: 141 MMOL/L
SPECIFIC GRAVITY URINE: 1.03
T4 FREE SERPL-MCNC: 1.6 NG/DL
T4 SERPL-MCNC: 7.9 UG/DL
TRIGL SERPL-MCNC: 90 MG/DL
TSH SERPL-ACNC: 1.04 UIU/ML
UROBILINOGEN URINE: NORMAL

## 2023-04-27 ENCOUNTER — APPOINTMENT (OUTPATIENT)
Dept: ENDOCRINOLOGY | Facility: CLINIC | Age: 38
End: 2023-04-27
Payer: COMMERCIAL

## 2023-04-27 PROCEDURE — 99213 OFFICE O/P EST LOW 20 MIN: CPT | Mod: 95

## 2023-04-27 NOTE — HISTORY OF PRESENT ILLNESS
[FreeTextEntry1] : Patient has a long-term history of hypothyroidism since age 15. She is currently on Synthroid 200 mcg daily skips one tab weekly. .  Many years ago she had a thyroid ultrasound and was not told of any abnormalities. She was diagnosed with type 2 diabetes approximately 3 years ago and is currently on metformin 2000 mg a  day and Ozempic 2 mg. . Over the past 6 months she has managed to lose around 30 pounds. She has had problems with facial hair but has always had regular menses. She has never been pregnant.. She is eating less . She was using the Pictorious sensor. Ran out of sensors and did not renew. . Has not done thyroid sonogram yet. . Was on steroids recently for an allergic reaction.\par Did not come in today because was having GI sxs with nausea and vomiting and midepigastric discomfort. Said had last week but resolved.   [Continuous Glucose Monitoring] : Continuous Glucose Monitoring: Yes [Lakia] : Lakia [FreeTextEntry2] : 78 [FreeTextEntry3] : 22 [FreeTextEntry4] : 0 [de-identified] : 6.8 [FreeTextEntry5] : 145 [FreeTextEntry6] : 31.3

## 2023-04-27 NOTE — REASON FOR VISIT
[Home] : at home, [unfilled] , at the time of the visit. [Medical Office: ([facility])___] : at the medical office located in  [Patient] : the patient [Self] : self [Follow - Up] : a follow-up visit [DM Type 2] : DM Type 2 [Weight Management/Obesity] : weight management/obesity

## 2023-04-27 NOTE — PHYSICAL EXAM
[Alert] : alert [No Acute Distress] : no acute distress [Normal Voice/Communication] : normal voice communication [Normal Sclera/Conjunctiva] : normal sclera/conjunctiva [Normal Outer Ear/Nose] : the ears and nose were normal in appearance [No Neck Mass] : no neck mass was observed [No Respiratory Distress] : no respiratory distress [Oriented x3] : oriented to person, place, and time [Normal Insight/Judgement] : insight and judgment were intact

## 2023-04-27 NOTE — ASSESSMENT
[Carbohydrate Consistent Diet] : carbohydrate consistent diet [Self Monitoring of Blood Glucose] : self monitoring of blood glucose [Importance of Diet and Exercise] : importance of diet and exercise to improve glycemic control, achieve weight loss and improve cardiovascular health [Diabetic Medications] : Risks and benefits of diabetic medications were discussed [FreeTextEntry1] : Patient with moderately controlled type 2 diabetes  prior  A1c was  8.4 , 7.9 and then  6.4.. GMI on CGM 6.8 TIR 78 %. . She did increase her metformin to  2000 and  Ozempic to 2 mg. . TSH suppressed so 3 mos ago decreased  Synthroid 200 ug  to 6 days a week.  Needs to do labs \par Will go to ER to evaluate abdomen; hold metformin and Ozempic for now. Will let us know but f/u CDE 2 weeks. \par r/o pancreatitis. Recent LFTs mildly elevated. \par  Encourage healthy diet and lifestyle. Still to do baseline thyroid sonogram,  We did Check androgens due to hirsutism but were normal. .\par f/u 2 mos \par

## 2023-05-01 ENCOUNTER — RX CHANGE (OUTPATIENT)
Age: 38
End: 2023-05-01

## 2023-05-02 ENCOUNTER — RX CHANGE (OUTPATIENT)
Age: 38
End: 2023-05-02

## 2023-05-15 ENCOUNTER — APPOINTMENT (OUTPATIENT)
Dept: ENDOCRINOLOGY | Facility: CLINIC | Age: 38
End: 2023-05-15
Payer: COMMERCIAL

## 2023-05-15 PROCEDURE — G0108 DIAB MANAGE TRN  PER INDIV: CPT | Mod: 95

## 2023-05-15 RX ORDER — METFORMIN ER 500 MG 500 MG/1
500 TABLET ORAL
Qty: 4 | Refills: 1 | Status: ACTIVE | COMMUNITY
Start: 2020-12-03

## 2023-05-16 ENCOUNTER — RX RENEWAL (OUTPATIENT)
Age: 38
End: 2023-05-16

## 2023-07-10 ENCOUNTER — APPOINTMENT (OUTPATIENT)
Dept: ENDOCRINOLOGY | Facility: CLINIC | Age: 38
End: 2023-07-10
Payer: COMMERCIAL

## 2023-07-10 DIAGNOSIS — E55.9 VITAMIN D DEFICIENCY, UNSPECIFIED: ICD-10-CM

## 2023-07-10 PROCEDURE — 99214 OFFICE O/P EST MOD 30 MIN: CPT | Mod: 95

## 2023-07-10 RX ORDER — SEMAGLUTIDE 2.68 MG/ML
8 INJECTION, SOLUTION SUBCUTANEOUS
Qty: 1 | Refills: 2 | Status: ACTIVE | COMMUNITY
Start: 2020-08-27

## 2023-07-10 NOTE — REASON FOR VISIT
[Medical Office: ([facility])___] : at the medical office located in  [Patient] : the patient [Self] : self [Follow - Up] : a follow-up visit [DM Type 2] : DM Type 2 [Weight Management/Obesity] : weight management/obesity [Home] : at home, [unfilled] , at the time of the visit.

## 2023-07-10 NOTE — PHYSICAL EXAM
[Alert] : alert [No Acute Distress] : no acute distress [Normal Voice/Communication] : normal voice communication [Normal Sclera/Conjunctiva] : normal sclera/conjunctiva [Normal Outer Ear/Nose] : the ears and nose were normal in appearance [No Neck Mass] : no neck mass was observed [No Respiratory Distress] : no respiratory distress [Normal Insight/Judgement] : insight and judgment were intact [Oriented x3] : oriented to person, place, and time

## 2023-07-10 NOTE — ASSESSMENT
[Importance of Diet and Exercise] : importance of diet and exercise to improve glycemic control, achieve weight loss and improve cardiovascular health [Self Monitoring of Blood Glucose] : self monitoring of blood glucose [Diabetic Medications] : Risks and benefits of diabetic medications were discussed [Carbohydrate Consistent Diet] : carbohydrate consistent diet [FreeTextEntry1] : Patient with moderately controlled type 2 diabetes  prior  A1c was  8.4 , 7.9 and then  6.4..  She did increase her metformin to  2000 and  Ozempic now from 1 mg  to  2 mg. . TSH suppressed so 3 mos ago decreased  Synthroid 200 ug  to 6 days a week.  Needs to do labs  and thyroid sonogram \par \par  Encourage healthy diet and lifestyle. Still to do baseline thyroid sonogram,  We did Check androgens due to hirsutism but were normal. .\par f/u 2 mos \par

## 2023-07-10 NOTE — HISTORY OF PRESENT ILLNESS
[FreeTextEntry1] : Patient has a long-term history of hypothyroidism since age 15. She is currently on Synthroid 200 mcg daily skips one tab weekly. .  Many years ago she had a thyroid ultrasound and was not told of any abnormalities. She was diagnosed with type 2 diabetes approximately 3 years ago and is currently on metformin 2000 mg a  day and Ozempic 2 mg ( claims ti have only been on 1 mg) . . Over the past 6 months she has managed to lose around 30 pounds. She has had problems with facial hair but has always had regular menses. She has never been pregnant.. She is eating less . She was using the jenni sensor. Ran out of sensors and did not renew. . Has not done thyroid sonogram yet. . Was on steroids recently for an allergic reaction.\par Did not come in today because of work.

## 2023-07-13 ENCOUNTER — APPOINTMENT (OUTPATIENT)
Dept: ENDOCRINOLOGY | Facility: CLINIC | Age: 38
End: 2023-07-13
Payer: COMMERCIAL

## 2023-07-13 PROCEDURE — 36415 COLL VENOUS BLD VENIPUNCTURE: CPT

## 2023-07-14 ENCOUNTER — APPOINTMENT (OUTPATIENT)
Dept: SURGERY | Facility: CLINIC | Age: 38
End: 2023-07-14

## 2023-07-17 LAB
ALBUMIN SERPL ELPH-MCNC: 4.9 G/DL
ALP BLD-CCNC: 60 U/L
ALT SERPL-CCNC: 36 U/L
ANION GAP SERPL CALC-SCNC: 11 MMOL/L
AST SERPL-CCNC: 33 U/L
BILIRUB SERPL-MCNC: 0.4 MG/DL
BUN SERPL-MCNC: 10 MG/DL
CALCIUM SERPL-MCNC: 10 MG/DL
CHLORIDE SERPL-SCNC: 104 MMOL/L
CHOLEST SERPL-MCNC: 148 MG/DL
CO2 SERPL-SCNC: 24 MMOL/L
CORTIS SERPL-MCNC: 7.4 UG/DL
CREAT SERPL-MCNC: 0.74 MG/DL
CREAT SPEC-SCNC: 160 MG/DL
DHEA-S SERPL-MCNC: 44.5 UG/DL
EGFR: 105 ML/MIN/1.73M2
ESTIMATED AVERAGE GLUCOSE: 148 MG/DL
GLUCOSE SERPL-MCNC: 86 MG/DL
HBA1C MFR BLD HPLC: 6.8 %
HDLC SERPL-MCNC: 62 MG/DL
LDLC SERPL CALC-MCNC: 71 MG/DL
MICROALBUMIN 24H UR DL<=1MG/L-MCNC: 1.5 MG/DL
MICROALBUMIN/CREAT 24H UR-RTO: 10 MG/G
NONHDLC SERPL-MCNC: 86 MG/DL
POTASSIUM SERPL-SCNC: 4.3 MMOL/L
PROT SERPL-MCNC: 7.7 G/DL
SODIUM SERPL-SCNC: 140 MMOL/L
T4 FREE SERPL-MCNC: 1.6 NG/DL
T4 SERPL-MCNC: 8.4 UG/DL
TESTOST FREE SERPL-MCNC: 1.7 PG/ML
TESTOST SERPL-MCNC: 25.5 NG/DL
TRIGL SERPL-MCNC: 76 MG/DL
TSH SERPL-ACNC: 3.15 UIU/ML

## 2023-08-28 ENCOUNTER — APPOINTMENT (OUTPATIENT)
Dept: ENDOCRINOLOGY | Facility: CLINIC | Age: 38
End: 2023-08-28
Payer: COMMERCIAL

## 2023-08-28 ENCOUNTER — NON-APPOINTMENT (OUTPATIENT)
Age: 38
End: 2023-08-28

## 2023-08-28 VITALS
BODY MASS INDEX: 39.25 KG/M2 | DIASTOLIC BLOOD PRESSURE: 64 MMHG | OXYGEN SATURATION: 95 % | WEIGHT: 265 LBS | HEIGHT: 69 IN | HEART RATE: 86 BPM | SYSTOLIC BLOOD PRESSURE: 112 MMHG

## 2023-08-28 LAB — GLUCOSE BLDC GLUCOMTR-MCNC: 114

## 2023-08-28 PROCEDURE — 99214 OFFICE O/P EST MOD 30 MIN: CPT | Mod: 25

## 2023-08-28 PROCEDURE — 82962 GLUCOSE BLOOD TEST: CPT

## 2023-08-28 NOTE — REASON FOR VISIT
[Follow - Up] : a follow-up visit [DM Type 2] : DM Type 2 [Hypothyroidism] : hypothyroidism [Thyroid nodule/ MNG] : thyroid nodule/ MNG [Weight Management/Obesity] : weight management/obesity [Home] : at home, [unfilled] , at the time of the visit. [Medical Office: ([facility])___] : at the medical office located in  [Patient] : the patient [Self] : self

## 2023-08-31 ENCOUNTER — APPOINTMENT (OUTPATIENT)
Dept: ENDOCRINOLOGY | Facility: CLINIC | Age: 38
End: 2023-08-31
Payer: COMMERCIAL

## 2023-08-31 PROCEDURE — 36415 COLL VENOUS BLD VENIPUNCTURE: CPT

## 2023-09-05 LAB — CORTIS SERPL-MCNC: 1.2 UG/DL

## 2023-09-05 NOTE — HISTORY OF PRESENT ILLNESS
[FreeTextEntry1] : Patient has a long-term history of hypothyroidism since age 15. She is currently on Synthroid 200 mcg daily skips one tab weekly. .  Many years ago she had a thyroid ultrasound and was not told of any abnormalities. She was diagnosed with type 2 diabetes approximately 3 years ago and is currently on metformin 2000 mg a day and Ozempic 2 mg.. Over the past 6 months she has managed to lose around 30 pounds. She has had problems with facial hair but has always had regular menses. She has never been pregnant. She is eating less. She was using the jenni sensor. Ran out of sensors and did not renew. . Has not done thyroid sonogram yet. . In past was on steroids recently for an allergic reaction. Has not been on any over a year.  She is planning to do a gastric sleeve.

## 2023-09-05 NOTE — PHYSICAL EXAM
[Alert] : alert [No Acute Distress] : no acute distress [Normal Voice/Communication] : normal voice communication [Normal Sclera/Conjunctiva] : normal sclera/conjunctiva [EOMI] : extra ocular movement intact [Normal Outer Ear/Nose] : the ears and nose were normal in appearance [No Neck Mass] : no neck mass was observed [No Respiratory Distress] : no respiratory distress [Clear to Auscultation] : lungs were clear to auscultation bilaterally [No Edema] : no peripheral edema [Normal Bowel Sounds] : normal bowel sounds [Not Tender] : non-tender [Soft] : abdomen soft [No CVA Tenderness] : no ~M costovertebral angle tenderness [No Stigmata of Cushings Syndrome] : no stigmata of Cushings Syndrome [Hirsutism] : hirsutism present [Oriented x3] : oriented to person, place, and time [Normal Insight/Judgement] : insight and judgment were intact [de-identified] : thyroid enlarged R> left 60 G

## 2023-09-05 NOTE — ASSESSMENT
[Carbohydrate Consistent Diet] : carbohydrate consistent diet [Importance of Diet and Exercise] : importance of diet and exercise to improve glycemic control, achieve weight loss and improve cardiovascular health [Self Monitoring of Blood Glucose] : self monitoring of blood glucose [Diabetic Medications] : Risks and benefits of diabetic medications were discussed [FreeTextEntry1] : Patient with moderately controlled type 2 diabetes prior A1c was 8.4 , 7.9 and then 6.4 now 6.8. ..  She did increase her metformin to  2000 and Ozempic now from 1 mg to  2 mg. . TSH suppressed so 3 mos ago decreased Synthroid 200 ug to 6 days a week.   Labs stable still to do thyroid sonogram.    Encourage healthy diet and lifestyle. Still to do baseline thyroid sonogram,  We did Check androgens due to hirsutism but were normal. . Prior to bariatric surgery to do low dose DST. f/u 2 mos.   9/5 /23 low DST nl cortisol 1.2  She is medically cleared by endocrine for bariatric surgery.

## 2023-09-07 ENCOUNTER — RX RENEWAL (OUTPATIENT)
Age: 38
End: 2023-09-07

## 2023-09-12 LAB
CORTICOSTEROID BIND GLOBULIN: 1.7 MG/DL
CORTIS SERPL-MCNC: 2.1 UG/DL
CORTISOL, FREE: 0.08 UG/DL
PFCX: 3.6 %

## 2023-10-16 RX ORDER — LEVOTHYROXINE SODIUM 0.2 MG/1
200 TABLET ORAL
Qty: 90 | Refills: 2 | Status: ACTIVE | COMMUNITY
Start: 1900-01-01 | End: 1900-01-01

## 2023-10-16 RX ORDER — LEVOTHYROXINE SODIUM 0.2 MG/1
200 TABLET ORAL
Qty: 30 | Refills: 3 | Status: DISCONTINUED | COMMUNITY
Start: 2023-03-06 | End: 2023-10-16

## 2023-11-30 ENCOUNTER — APPOINTMENT (OUTPATIENT)
Dept: ENDOCRINOLOGY | Facility: CLINIC | Age: 38
End: 2023-11-30
Payer: COMMERCIAL

## 2023-11-30 VITALS
HEIGHT: 69 IN | HEART RATE: 81 BPM | BODY MASS INDEX: 33.94 KG/M2 | DIASTOLIC BLOOD PRESSURE: 70 MMHG | WEIGHT: 229.13 LBS | OXYGEN SATURATION: 96 % | SYSTOLIC BLOOD PRESSURE: 122 MMHG

## 2023-11-30 LAB
GLUCOSE BLDC GLUCOMTR-MCNC: 111
HBA1C MFR BLD HPLC: 5.6

## 2023-11-30 PROCEDURE — 82962 GLUCOSE BLOOD TEST: CPT

## 2023-11-30 PROCEDURE — 83036 HEMOGLOBIN GLYCOSYLATED A1C: CPT | Mod: QW

## 2023-11-30 PROCEDURE — 99214 OFFICE O/P EST MOD 30 MIN: CPT | Mod: 25

## 2024-01-08 ENCOUNTER — RX RENEWAL (OUTPATIENT)
Age: 39
End: 2024-01-08

## 2024-01-08 RX ORDER — ATORVASTATIN CALCIUM 10 MG/1
10 TABLET, FILM COATED ORAL
Qty: 90 | Refills: 1 | Status: ACTIVE | COMMUNITY
Start: 2021-10-07 | End: 1900-01-01

## 2024-02-22 ENCOUNTER — APPOINTMENT (OUTPATIENT)
Dept: ENDOCRINOLOGY | Facility: CLINIC | Age: 39
End: 2024-02-22
Payer: COMMERCIAL

## 2024-02-22 PROCEDURE — 99213 OFFICE O/P EST LOW 20 MIN: CPT

## 2024-02-22 NOTE — REASON FOR VISIT
[Follow - Up] : a follow-up visit [Weight Management/Obesity] : weight management/obesity [DM Type 2] : DM Type 2 [Medical Office: ([facility])___] : at the medical office located in  [Home] : at home, [unfilled] , at the time of the visit. [Patient] : the patient [Self] : self

## 2024-02-22 NOTE — ASSESSMENT
[Carbohydrate Consistent Diet] : carbohydrate consistent diet [Importance of Diet and Exercise] : importance of diet and exercise to improve glycemic control, achieve weight loss and improve cardiovascular health [Self Monitoring of Blood Glucose] : self monitoring of blood glucose [Diabetic Medications] : Risks and benefits of diabetic medications were discussed [FreeTextEntry1] : Patient with now well controlled DM only on metformin 500 mg OD. She is s/p gastric sleeve 5 months ago with good weight loss. A1C now 5.6.  She is still on levothyroxine 200 ug OD, needs to do labs when off Biotin a few days.  Will call when done otherwise f/u 3 mos.

## 2024-02-22 NOTE — HISTORY OF PRESENT ILLNESS
[FreeTextEntry1] : Patient has a long-term history of hypothyroidism since age 15. She is currently on Synthroid 200 mcg daily skips one tab weekly. .  Many years ago she had a thyroid ultrasound and was not told of any abnormalities. She was diagnosed with type 2 diabetes approximately 3 years ago and is currently off metformin and off Ozempic. She has had problems with facial hair but has always had regular menses. She has never been pregnant. Has not done thyroid sonogram yet. . Was on steroids recently for an allergic reaction. Had gestic sleeve in September and has lost over 20 lbs. Had recent labs at PCP but is on Biotin and did not stop.

## 2024-02-22 NOTE — PHYSICAL EXAM
[No Acute Distress] : no acute distress [Alert] : alert [Normal Voice/Communication] : normal voice communication [Normal Sclera/Conjunctiva] : normal sclera/conjunctiva [Normal Outer Ear/Nose] : the ears and nose were normal in appearance [No Neck Mass] : no neck mass was observed [No Respiratory Distress] : no respiratory distress [Oriented x3] : oriented to person, place, and time [Normal Insight/Judgement] : insight and judgment were intact

## 2024-06-03 ENCOUNTER — APPOINTMENT (OUTPATIENT)
Dept: ENDOCRINOLOGY | Facility: CLINIC | Age: 39
End: 2024-06-03
Payer: COMMERCIAL

## 2024-06-03 DIAGNOSIS — E11.9 TYPE 2 DIABETES MELLITUS W/OUT COMPLICATIONS: ICD-10-CM

## 2024-06-03 DIAGNOSIS — E03.9 HYPOTHYROIDISM, UNSPECIFIED: ICD-10-CM

## 2024-06-03 DIAGNOSIS — E66.9 OBESITY, UNSPECIFIED: ICD-10-CM

## 2024-06-03 DIAGNOSIS — E78.5 HYPERLIPIDEMIA, UNSPECIFIED: ICD-10-CM

## 2024-06-03 PROCEDURE — 99213 OFFICE O/P EST LOW 20 MIN: CPT | Mod: 95

## 2024-06-03 RX ORDER — BLOOD SUGAR DIAGNOSTIC
STRIP MISCELLANEOUS 3 TIMES DAILY
Qty: 1 | Refills: 5 | Status: ACTIVE | COMMUNITY
Start: 2020-09-09 | End: 1900-01-01

## 2024-06-03 NOTE — REASON FOR VISIT
[Follow - Up] : a follow-up visit [DM Type 2] : DM Type 2 [Weight Management/Obesity] : weight management/obesity [Home] : at home, [unfilled] , at the time of the visit. [Medical Office: ([facility])___] : at the medical office located in  [Patient] : the patient [Self] : self

## 2024-06-03 NOTE — HISTORY OF PRESENT ILLNESS
[FreeTextEntry1] : Patient has a long-term history of hypothyroidism since age 15. She is currently on Synthroid 200 mcg daily skips one tab weekly. .  Many years ago she had a thyroid ultrasound and was not told of any abnormalities. She was diagnosed with type 2 diabetes approximately 3 years ago and is currently off metformin and off Ozempic. She has had problems with facial hair but has always had regular menses. She has never been pregnant. Has not done thyroid sonogram yet. . Was on steroids recently for an allergic reaction. Had gestic sleeve in September and has lost over 20 lbs. however has stabilized.  Had recent labs at PCP but is on Biotin and did not stop.

## 2024-06-03 NOTE — ASSESSMENT
[Carbohydrate Consistent Diet] : carbohydrate consistent diet [Importance of Diet and Exercise] : importance of diet and exercise to improve glycemic control, achieve weight loss and improve cardiovascular health [Self Monitoring of Blood Glucose] : self monitoring of blood glucose [Diabetic Medications] : Risks and benefits of diabetic medications were discussed [FreeTextEntry1] : Patient with now well controlled DM only on metformin 500 mg OD. She is s/p gastric sleeve 8 months ago with good weight loss. A1C now 5.6.  Since home weight has stabilized a consideration would be to resume metformin and or Ozempic. She is still on levothyroxine 200 ug OD, needs to do labs when off Biotin a few days.  Will call when done otherwise f/u 3 mos.

## 2024-09-30 ENCOUNTER — APPOINTMENT (OUTPATIENT)
Dept: ENDOCRINOLOGY | Facility: CLINIC | Age: 39
End: 2024-09-30
Payer: COMMERCIAL

## 2024-09-30 VITALS
HEIGHT: 69 IN | DIASTOLIC BLOOD PRESSURE: 76 MMHG | HEART RATE: 85 BPM | WEIGHT: 242 LBS | BODY MASS INDEX: 35.84 KG/M2 | OXYGEN SATURATION: 96 % | SYSTOLIC BLOOD PRESSURE: 122 MMHG

## 2024-09-30 DIAGNOSIS — E03.9 HYPOTHYROIDISM, UNSPECIFIED: ICD-10-CM

## 2024-09-30 DIAGNOSIS — E78.5 HYPERLIPIDEMIA, UNSPECIFIED: ICD-10-CM

## 2024-09-30 LAB — GLUCOSE BLDC GLUCOMTR-MCNC: 88

## 2024-09-30 PROCEDURE — 99214 OFFICE O/P EST MOD 30 MIN: CPT

## 2024-09-30 PROCEDURE — 82962 GLUCOSE BLOOD TEST: CPT

## 2024-09-30 NOTE — PHYSICAL EXAM
[Alert] : alert [No Acute Distress] : no acute distress [Normal Voice/Communication] : normal voice communication [Normal Sclera/Conjunctiva] : normal sclera/conjunctiva [Normal Outer Ear/Nose] : the ears and nose were normal in appearance [Normal TMs] : both tympanic membranes were normal [No Neck Mass] : no neck mass was observed [Thyroid Not Enlarged] : the thyroid was not enlarged [No Thyroid Nodules] : no palpable thyroid nodules [No Respiratory Distress] : no respiratory distress [Clear to Auscultation] : lungs were clear to auscultation bilaterally [Normal PMI] : the apical impulse was normal [Normal Rate] : heart rate was normal [Regular Rhythm] : with a regular rhythm [No Edema] : no peripheral edema [Oriented x3] : oriented to person, place, and time [Normal Insight/Judgement] : insight and judgment were intact

## 2024-09-30 NOTE — ASSESSMENT
[Carbohydrate Consistent Diet] : carbohydrate consistent diet [Importance of Diet and Exercise] : importance of diet and exercise to improve glycemic control, achieve weight loss and improve cardiovascular health [Self Monitoring of Blood Glucose] : self monitoring of blood glucose [Diabetic Medications] : Risks and benefits of diabetic medications were discussed [Levothyroxine] : The patient was instructed to take Levothyroxine on an empty stomach, separate from vitamins, and wait at least 30 minutes before eating [FreeTextEntry1] : Patient with now well controlled DM off all meds A1c 6.0.. She is s/p gastric sleeve 12 months ago with good weight loss.  However possibly due to stress more recently has gained close to 20 pounds..  Since home weight has increased a consideration would be to resume metformin and or Ozempic.  She is not interested at this time. She is still on levothyroxine 200 ug OD, explained TSH is greater than 30 because she has been taking her medicines erratically.  She will start taking it daily and check her labs again in 1 month.  This could explain why her cholesterol was greater than 300 we will resume atorvastatin 10 mg once a day.  Will do labs 1 month and call. f/u 3 mos

## 2024-09-30 NOTE — HISTORY OF PRESENT ILLNESS
[FreeTextEntry1] : Patient has a long-term history of hypothyroidism since age 15. She is currently on Synthroid 200 mcg daily skips one tab weekly. .  However admits to being stressed and not taking her medication regularly probably taking every other day.  TSH now over 30.  Cholesterol greater than 300.  Many years ago she had a thyroid ultrasound and was not told of any abnormalities. She was diagnosed with type 2 diabetes approximately 3 years ago and is currently off metformin and off Ozempic. She has had problems with facial hair but has always had regular menses. She has never been pregnant. Has not done thyroid sonogram yet. . Was on steroids recently for an allergic reaction. Had gestic sleeve in September 2023  and has lost over 40 lbs.   More recently regaining.

## 2024-11-26 DIAGNOSIS — E03.9 HYPOTHYROIDISM, UNSPECIFIED: ICD-10-CM

## 2025-01-22 ENCOUNTER — APPOINTMENT (OUTPATIENT)
Dept: ENDOCRINOLOGY | Facility: CLINIC | Age: 40
End: 2025-01-22

## 2025-07-10 ENCOUNTER — LABORATORY RESULT (OUTPATIENT)
Age: 40
End: 2025-07-10

## 2025-07-10 ENCOUNTER — APPOINTMENT (OUTPATIENT)
Dept: ENDOCRINOLOGY | Facility: CLINIC | Age: 40
End: 2025-07-10
Payer: COMMERCIAL

## 2025-07-10 VITALS
SYSTOLIC BLOOD PRESSURE: 136 MMHG | HEART RATE: 105 BPM | BODY MASS INDEX: 37.03 KG/M2 | WEIGHT: 250 LBS | DIASTOLIC BLOOD PRESSURE: 79 MMHG | OXYGEN SATURATION: 99 % | HEIGHT: 69 IN

## 2025-07-10 LAB
GLUCOSE BLDC GLUCOMTR-MCNC: 93
HBA1C MFR BLD HPLC: 6.3

## 2025-07-10 PROCEDURE — 99214 OFFICE O/P EST MOD 30 MIN: CPT | Mod: 25

## 2025-07-10 PROCEDURE — 82962 GLUCOSE BLOOD TEST: CPT

## 2025-07-10 PROCEDURE — 83036 HEMOGLOBIN GLYCOSYLATED A1C: CPT | Mod: QW

## 2025-07-10 RX ORDER — SEMAGLUTIDE 0.68 MG/ML
2 INJECTION, SOLUTION SUBCUTANEOUS
Qty: 1 | Refills: 1 | Status: ACTIVE | COMMUNITY
Start: 2025-07-10 | End: 1900-01-01

## 2025-09-08 ENCOUNTER — RX RENEWAL (OUTPATIENT)
Age: 40
End: 2025-09-08